# Patient Record
Sex: FEMALE | Race: OTHER | URBAN - METROPOLITAN AREA
[De-identification: names, ages, dates, MRNs, and addresses within clinical notes are randomized per-mention and may not be internally consistent; named-entity substitution may affect disease eponyms.]

---

## 2023-07-31 ENCOUNTER — OFFICE VISIT (OUTPATIENT)
Dept: FAMILY MEDICINE CLINIC | Facility: CLINIC | Age: 38
End: 2023-07-31
Payer: COMMERCIAL

## 2023-07-31 VITALS
HEIGHT: 57.01 IN | WEIGHT: 140.81 LBS | HEART RATE: 87 BPM | DIASTOLIC BLOOD PRESSURE: 80 MMHG | OXYGEN SATURATION: 99 % | RESPIRATION RATE: 18 BRPM | SYSTOLIC BLOOD PRESSURE: 122 MMHG | BODY MASS INDEX: 30.38 KG/M2 | TEMPERATURE: 98 F

## 2023-07-31 DIAGNOSIS — B07.0 PLANTAR WART, LEFT FOOT: ICD-10-CM

## 2023-07-31 DIAGNOSIS — Z13.21 SCREENING FOR ENDOCRINE, NUTRITIONAL, METABOLIC AND IMMUNITY DISORDER: ICD-10-CM

## 2023-07-31 DIAGNOSIS — E66.9 OBESITY (BMI 30-39.9): ICD-10-CM

## 2023-07-31 DIAGNOSIS — Z13.228 SCREENING FOR ENDOCRINE, NUTRITIONAL, METABOLIC AND IMMUNITY DISORDER: ICD-10-CM

## 2023-07-31 DIAGNOSIS — E55.9 VITAMIN D DEFICIENCY: ICD-10-CM

## 2023-07-31 DIAGNOSIS — Z00.00 ANNUAL PHYSICAL EXAM: Primary | ICD-10-CM

## 2023-07-31 DIAGNOSIS — Z12.4 SCREENING FOR CERVICAL CANCER: ICD-10-CM

## 2023-07-31 DIAGNOSIS — Z13.6 SCREENING FOR ISCHEMIC HEART DISEASE: ICD-10-CM

## 2023-07-31 DIAGNOSIS — Z13.29 SCREENING FOR ENDOCRINE, NUTRITIONAL, METABOLIC AND IMMUNITY DISORDER: ICD-10-CM

## 2023-07-31 DIAGNOSIS — Z12.31 ENCOUNTER FOR SCREENING MAMMOGRAM FOR MALIGNANT NEOPLASM OF BREAST: ICD-10-CM

## 2023-07-31 DIAGNOSIS — Z13.0 SCREENING FOR ENDOCRINE, NUTRITIONAL, METABOLIC AND IMMUNITY DISORDER: ICD-10-CM

## 2023-07-31 PROCEDURE — 3079F DIAST BP 80-89 MM HG: CPT | Performed by: FAMILY MEDICINE

## 2023-07-31 PROCEDURE — 99386 PREV VISIT NEW AGE 40-64: CPT | Performed by: FAMILY MEDICINE

## 2023-07-31 PROCEDURE — 3008F BODY MASS INDEX DOCD: CPT | Performed by: FAMILY MEDICINE

## 2023-07-31 PROCEDURE — 3074F SYST BP LT 130 MM HG: CPT | Performed by: FAMILY MEDICINE

## 2023-07-31 PROCEDURE — 87624 HPV HI-RISK TYP POOLED RSLT: CPT | Performed by: FAMILY MEDICINE

## 2023-08-01 LAB — HPV I/H RISK 1 DNA SPEC QL NAA+PROBE: NEGATIVE

## 2023-08-02 LAB
ABSOLUTE BASOPHILS: 38 CELLS/UL (ref 0–200)
ABSOLUTE EOSINOPHILS: 132 CELLS/UL (ref 15–500)
ABSOLUTE LYMPHOCYTES: 2041 CELLS/UL (ref 850–3900)
ABSOLUTE MONOCYTES: 309 CELLS/UL (ref 200–950)
ABSOLUTE NEUTROPHILS: 3780 CELLS/UL (ref 1500–7800)
ALBUMIN/GLOBULIN RATIO: 1.6 (CALC) (ref 1–2.5)
ALBUMIN: 4.3 G/DL (ref 3.6–5.1)
ALKALINE PHOSPHATASE: 71 U/L (ref 31–125)
ALT: 13 U/L (ref 6–29)
AST: 14 U/L (ref 10–30)
BASOPHILS: 0.6 %
BILIRUBIN, TOTAL: 0.3 MG/DL (ref 0.2–1.2)
BUN: 11 MG/DL (ref 7–25)
CALCIUM: 9.4 MG/DL (ref 8.6–10.2)
CARBON DIOXIDE: 26 MMOL/L (ref 20–32)
CHLORIDE: 105 MMOL/L (ref 98–110)
CHOL/HDLC RATIO: 4.5 (CALC)
CHOLESTEROL, TOTAL: 206 MG/DL
CREATININE: 0.63 MG/DL (ref 0.5–0.99)
EGFR: 115 ML/MIN/1.73M2
EOSINOPHILS: 2.1 %
GLOBULIN: 2.7 G/DL (CALC) (ref 1.9–3.7)
GLUCOSE: 91 MG/DL (ref 65–99)
HDL CHOLESTEROL: 46 MG/DL
HEMATOCRIT: 38.9 % (ref 35–45)
HEMOGLOBIN: 12.1 G/DL (ref 11.7–15.5)
LDL-CHOLESTEROL: 128 MG/DL (CALC)
LYMPHOCYTES: 32.4 %
MCH: 27.4 PG (ref 27–33)
MCHC: 31.1 G/DL (ref 32–36)
MCV: 88 FL (ref 80–100)
MONOCYTES: 4.9 %
MPV: 12 FL (ref 7.5–12.5)
NEUTROPHILS: 60 %
NON-HDL CHOLESTEROL: 160 MG/DL (CALC)
PLATELET COUNT: 223 THOUSAND/UL (ref 140–400)
POTASSIUM: 4.5 MMOL/L (ref 3.5–5.3)
PROTEIN, TOTAL: 7 G/DL (ref 6.1–8.1)
RDW: 13 % (ref 11–15)
RED BLOOD CELL COUNT: 4.42 MILLION/UL (ref 3.8–5.1)
SODIUM: 139 MMOL/L (ref 135–146)
T4, FREE: 1.3 NG/DL (ref 0.8–1.8)
TRIGLYCERIDES: 186 MG/DL
TSH W/REFLEX TO FT4: 5.84 MIU/L
VITAMIN D, 25-OH, TOTAL: 20 NG/ML (ref 30–100)
WHITE BLOOD CELL COUNT: 6.3 THOUSAND/UL (ref 3.8–10.8)

## 2023-08-28 ENCOUNTER — OFFICE VISIT (OUTPATIENT)
Dept: FAMILY MEDICINE CLINIC | Facility: CLINIC | Age: 38
End: 2023-08-28
Payer: COMMERCIAL

## 2023-08-28 VITALS
OXYGEN SATURATION: 99 % | WEIGHT: 139.81 LBS | BODY MASS INDEX: 30 KG/M2 | TEMPERATURE: 98 F | SYSTOLIC BLOOD PRESSURE: 126 MMHG | RESPIRATION RATE: 18 BRPM | HEART RATE: 65 BPM | DIASTOLIC BLOOD PRESSURE: 84 MMHG

## 2023-08-28 DIAGNOSIS — E55.9 VITAMIN D DEFICIENCY: ICD-10-CM

## 2023-08-28 DIAGNOSIS — E78.2 MIXED HYPERLIPIDEMIA: ICD-10-CM

## 2023-08-28 DIAGNOSIS — E03.9 HYPOTHYROIDISM, UNSPECIFIED TYPE: Primary | ICD-10-CM

## 2023-08-28 PROCEDURE — 99214 OFFICE O/P EST MOD 30 MIN: CPT | Performed by: FAMILY MEDICINE

## 2023-08-28 PROCEDURE — 3074F SYST BP LT 130 MM HG: CPT | Performed by: FAMILY MEDICINE

## 2023-08-28 PROCEDURE — 3079F DIAST BP 80-89 MM HG: CPT | Performed by: FAMILY MEDICINE

## 2023-08-28 RX ORDER — CHOLECALCIFEROL (VITAMIN D3) 1250 MCG
1 CAPSULE ORAL WEEKLY
Qty: 12 CAPSULE | Refills: 0 | Status: SHIPPED | OUTPATIENT
Start: 2023-08-28

## 2023-08-28 RX ORDER — LEVOTHYROXINE SODIUM 0.03 MG/1
25 TABLET ORAL
Qty: 90 TABLET | Refills: 0 | Status: SHIPPED | OUTPATIENT
Start: 2023-08-28

## 2023-10-04 ENCOUNTER — HOSPITAL ENCOUNTER (OUTPATIENT)
Dept: MAMMOGRAPHY | Facility: HOSPITAL | Age: 38
Discharge: HOME | End: 2023-10-04
Attending: FAMILY MEDICINE
Payer: COMMERCIAL

## 2023-10-04 DIAGNOSIS — Z12.31 ENCOUNTER FOR SCREENING MAMMOGRAM FOR MALIGNANT NEOPLASM OF BREAST: ICD-10-CM

## 2023-10-04 PROCEDURE — 77067 SCR MAMMO BI INCL CAD: CPT | Performed by: FAMILY MEDICINE

## 2023-10-04 PROCEDURE — 77063 BREAST TOMOSYNTHESIS BI: CPT | Performed by: FAMILY MEDICINE

## 2023-10-26 ENCOUNTER — HOSPITAL ENCOUNTER (OUTPATIENT)
Dept: MAMMOGRAPHY | Facility: HOSPITAL | Age: 38
Discharge: HOME | End: 2023-10-26
Attending: FAMILY MEDICINE

## 2023-10-26 DIAGNOSIS — R92.2 INCONCLUSIVE MAMMOGRAM: ICD-10-CM

## 2023-10-26 DIAGNOSIS — R92.8 ABNORMAL MAMMOGRAM: Primary | ICD-10-CM

## 2023-10-26 PROCEDURE — 76642 ULTRASOUND BREAST LIMITED: CPT | Performed by: FAMILY MEDICINE

## 2023-10-26 PROCEDURE — 77066 DX MAMMO INCL CAD BI: CPT | Performed by: FAMILY MEDICINE

## 2023-10-26 PROCEDURE — 77062 BREAST TOMOSYNTHESIS BI: CPT | Performed by: FAMILY MEDICINE

## 2023-10-26 NOTE — IMAGING NOTE
Madysonluba Shannon is recommended for an ultrasound guided biopsy of the right breast x 2 sites by . History Inconclusive mammogram   Findings-There are 2 masses in the right breast at the 10 o'clock position 13 cm from nipple and the 9:30 position 10 cm from nipple which may represent fibroadenomas, but for which tissue sampling is recommended for definitive histopathologic diagnosis. Recommendation-ULTRASOUND-GUIDED BIOPSY: RIGHT BREASTx 2     See EMR for complete imaging report    Medications and allergies reviewed: NKDA reported  Current Outpatient Medications   Medication Sig Dispense Refill    levothyroxine 25 MCG Oral Tab Take 1 tablet (25 mcg total) by mouth before breakfast. 90 tablet 0    Cholecalciferol (VITAMIN D3) 1.25 MG (92272 UT) Oral Cap Take 1 capsule by mouth once a week. 12 capsule 0       Madyson Faithnereida denies the use of prescribed anticoagulants, denies known bleeding disorders and/or liver disease, denies chemotherapy    Procedure explained to [facility] and her  Marcella Polk. All questions answered to [employer] satisfaction. [facility] provided with written educational material.     Ms. Krystin Shannon instructed to take 1000 mg of acetaminophen on the day of the biopsy, eat a light meal, and bring or wear a sport bra. Post biopsy care and instruction reviewed: including no lifting more than five pounds, no upper body exercise, icing of biopsy site, no submerging in water. Madyson Shannon verbalized understanding. Ms. Krystin Shannon provided with an appointment at [facility] women's imaging center- Friday, November 24 at 1300. Appointment confirmed with breast center .

## 2023-11-17 DIAGNOSIS — E55.9 VITAMIN D DEFICIENCY: ICD-10-CM

## 2023-11-17 RX ORDER — CHOLECALCIFEROL (VITAMIN D3) 1250 MCG
1 CAPSULE ORAL WEEKLY
Qty: 12 CAPSULE | Refills: 0 | OUTPATIENT
Start: 2023-11-17

## 2023-11-17 NOTE — TELEPHONE ENCOUNTER
Refill inappropriate. Pt needs f/u appt. Per LOV 8/28/2023    Return in about 3 months (around 11/28/2023) for hypothyroidism, medication follow-up, Cholesterol.

## 2023-11-21 ENCOUNTER — TELEPHONE (OUTPATIENT)
Dept: FAMILY MEDICINE CLINIC | Facility: CLINIC | Age: 38
End: 2023-11-21

## 2023-11-21 NOTE — TELEPHONE ENCOUNTER
Heather from mammography called and wants to inform provider that pt's  called and cancelled the scheduled breast biopsy.    Routed to provider for review of above.

## 2023-11-22 NOTE — TELEPHONE ENCOUNTER
Noted.    Please call and speak with pt to see about if they will reschedule the breast biopsy. (On mammogram she was noted to have 2 masses in the right breast, radiologist reccomends biopsy of both sites).  Thye need to call mammogram department again to schedule this.     Thanks.

## 2023-11-22 NOTE — TELEPHONE ENCOUNTER
LMOM to return call to the office. Provided pt office phone [phone] along with office hours.

## 2023-11-23 DIAGNOSIS — E03.9 HYPOTHYROIDISM, UNSPECIFIED TYPE: ICD-10-CM

## 2023-11-24 RX ORDER — LEVOTHYROXINE SODIUM 0.03 MG/1
25 TABLET ORAL
Qty: 30 TABLET | Refills: 0 | Status: SHIPPED | OUTPATIENT
Start: 2023-11-24

## 2023-11-24 NOTE — TELEPHONE ENCOUNTER
Pt said she needs to talk to her insurance regarding levothyroxine since it was not covered. Pt said she will call back to our office to schedule an appointment. Pt does not want our office to call her about this issue.

## 2023-11-24 NOTE — TELEPHONE ENCOUNTER
30-day supply given for now. Please inform pt she is due to recheck her TSH level, to make sure she is at a therapeutic level. Lab order is in chart for [facility].     Thanks.

## 2023-11-24 NOTE — TELEPHONE ENCOUNTER
Refill Failed Protocol:     Pt requesting refill of   Requested Prescriptions     Pending Prescriptions Disp Refills    LEVOTHYROXINE 25 MCG Oral Tab [Pharmacy Med Name: LEVOTHYROXINE 25 MCG TABLET] 90 tablet 0     Sig: TAKE 1 TABLET BY MOUTH BEFORE BREAKFAST. Last Time Medication was Filled:  8/28/2023    Last Office Visit with Provider: 8/8/2023    Unable to approve refill,   Thyroid Supplements Protocol [id number]/23/2023 12:16 AM    TSH value between 0.350 and 5.500 IU/ml    TSH test in past 12 months    Appointment in past 12 or next 3 months     No future appointments. Per LOV    Return in about 3 months (around 11/28/2023) for hypothyroidism, medication follow-up, Cholesterol. LMOM to return call to the office. Provided pt office phone [phone] along with office hours.

## 2023-12-11 DIAGNOSIS — E03.9 HYPOTHYROIDISM, UNSPECIFIED TYPE: ICD-10-CM

## 2023-12-12 RX ORDER — LEVOTHYROXINE SODIUM 0.03 MG/1
25 TABLET ORAL
Qty: 7 TABLET | Refills: 0 | Status: SHIPPED | OUTPATIENT
Start: 2023-12-12

## 2023-12-12 NOTE — TELEPHONE ENCOUNTER
Pt still has not completed repeat fasting labs and TSH in her chart. Need to make sure she is at therapeutic level. Her previous TSH was abnormal.  Will only give 1 week supply. Please call her to tell her to go to [facility], lab orders in chart. See previous refill request from 11/23/23:      11/24/23  9:18 AM  Note  30-day supply given for now. Please inform pt she is due to recheck her TSH level, to make sure she is at a therapeutic level. Lab order is in chart for [facility].      Thanks. Thanks.

## 2023-12-12 NOTE — TELEPHONE ENCOUNTER
Refill Failed Protocol:     Pt requesting refill of   Requested Prescriptions     Pending Prescriptions Disp Refills    LEVOTHYROXINE 25 MCG Oral Tab [Pharmacy Med Name: LEVOTHYROXINE 25 MCG TABLET] 30 tablet 0     Sig: TAKE 1 TABLET BY MOUTH EVERY DAY BEFORE BREAKFAST      Last Time Medication was Filled:  11/24/2023    Last Office Visit with Provider: 8/28/2023    Unable to approve refill,   Thyroid Supplements Protocol [id number]/11/2023 09:45 PM   Protocol Details TSH value between 0.350 and 5.500 IU/ml    TSH test in past 12 months    Appointment in past 12 or next 3 months          No future appointments.

## 2023-12-15 ENCOUNTER — TELEPHONE (OUTPATIENT)
Dept: FAMILY MEDICINE CLINIC | Facility: CLINIC | Age: 38
End: 2023-12-15

## 2023-12-15 ENCOUNTER — TELEPHONE (OUTPATIENT)
Dept: MAMMOGRAPHY | Facility: HOSPITAL | Age: 38
End: 2023-12-15

## 2023-12-15 NOTE — TELEPHONE ENCOUNTER
Alisha with [facility] called office to follow up on Biopsy recommendation she wants to know if the nurse got a hold of the patient in regards to this matter.

## 2023-12-15 NOTE — TELEPHONE ENCOUNTER
Left message with office of Dr Shannan Sullivan regarding ability to contact patient for follow up of biopsy recommendation from 10-26-23. Nurse was given message to return our call.

## 2023-12-15 NOTE — TELEPHONE ENCOUNTER
Called pt, spouse  the phone. He states they have not schedule biopsy yet. They will call us when they do.     Called Alisha, unable to reach, message left,

## 2024-01-24 ENCOUNTER — TELEPHONE (OUTPATIENT)
Dept: FAMILY MEDICINE CLINIC | Facility: CLINIC | Age: 39
End: 2024-01-24

## 2024-01-24 NOTE — TELEPHONE ENCOUNTER
RN with Mammography called, states PCP ordered breast biopsy but pt has not gotten procedure done. RN thinks spouse is the reason pt has not gotten procedure.    RN would like PCP to discus this with pt during 1/25/2024 appt and determine if  pt will not be getting biopsy so Mammo department can stop calling her.    Routed to provider for review.

## 2024-01-25 ENCOUNTER — OFFICE VISIT (OUTPATIENT)
Dept: FAMILY MEDICINE CLINIC | Facility: CLINIC | Age: 39
End: 2024-01-25
Payer: COMMERCIAL

## 2024-01-25 VITALS
RESPIRATION RATE: 18 BRPM | DIASTOLIC BLOOD PRESSURE: 90 MMHG | WEIGHT: 145.63 LBS | BODY MASS INDEX: 32 KG/M2 | TEMPERATURE: 98 F | SYSTOLIC BLOOD PRESSURE: 122 MMHG | HEART RATE: 81 BPM | OXYGEN SATURATION: 100 %

## 2024-01-25 DIAGNOSIS — Z23 NEED FOR VACCINATION: ICD-10-CM

## 2024-01-25 DIAGNOSIS — E78.2 MIXED HYPERLIPIDEMIA: ICD-10-CM

## 2024-01-25 DIAGNOSIS — E03.9 HYPOTHYROIDISM, UNSPECIFIED TYPE: Primary | ICD-10-CM

## 2024-01-25 DIAGNOSIS — R92.8 ABNORMAL MAMMOGRAM: ICD-10-CM

## 2024-01-25 DIAGNOSIS — E55.9 VITAMIN D DEFICIENCY: ICD-10-CM

## 2024-01-25 PROBLEM — R79.89 ELEVATED TSH: Status: ACTIVE | Noted: 2017-09-14

## 2024-01-25 PROCEDURE — 3074F SYST BP LT 130 MM HG: CPT | Performed by: FAMILY MEDICINE

## 2024-01-25 PROCEDURE — 99214 OFFICE O/P EST MOD 30 MIN: CPT | Performed by: FAMILY MEDICINE

## 2024-01-25 PROCEDURE — 3080F DIAST BP >= 90 MM HG: CPT | Performed by: FAMILY MEDICINE

## 2024-01-25 RX ORDER — LEVOTHYROXINE SODIUM 0.03 MG/1
25 TABLET ORAL
Qty: 90 TABLET | Refills: 1 | Status: SHIPPED | OUTPATIENT
Start: 2024-01-25

## 2024-01-28 NOTE — PROGRESS NOTES
CHIEF COMPLAINT:   Chief Complaint   Patient presents with    Follow - Up         HPI:     Madyson Flynn is a 38 year old female presents for HL, thyroid, and abnormal mammo.      HL:  note don 2023 labs to have elevate lipids. Has been walking at home, but no dedicated exercise.    Previous HPI from 2023: pt was noted on routine physical labs in 2023 to have elevated lipids. She does not eat much meat in her diet. She also tries to limit her sugars and sweets.      Vit D deficiency: Was noted on recent labs to have low Vit D in 2023. Completed high dose Vit D replacement, but did not recheck labs yet.     Hypothyroidism:  was advised to restart L-thyroxine 25 mcg  at previous visit. Is due for recheck. She has fatigue, otherwise feels well.    Previous HPI from 2023: had hypothyroidism while pregnant with her first son 13 yrs ago, then problem persisted during the 2nd pregnancy.  She stopped taking the medication 6 yrs ago, since she felt well and labs were normal while off of medication.  This was while she was still in [place].  Lately, she has been experiencing weight gain, dry skin, fatigue.      Abnormal mammo: 2 masses seen in right breast on mammo.  Recommended for breast bx, but pt has not scheduled yet, she and  feel it is not necessary since \"I feel fine.\"                HISTORY:  No past medical history on file.   Past Surgical History:   Procedure Laterality Date     DELIVERY ONLY      ,     TUBAL LIGATION      2016      Family History   Problem Relation Age of Onset    No Known Problems Mother     No Known Problems Father     No Known Problems Maternal Grandmother     No Known Problems Maternal Grandfather     No Known Problems Paternal Grandmother     No Known Problems Paternal Grandfather     Breast Cancer Neg     Colon Cancer Neg       Social History:   Social History     Socioeconomic History    Marital status:    Tobacco Use    Smoking status: Never    Smokeless  tobacco: Never   Substance and Sexual Activity    Alcohol use: Never    Drug use: Never        Medications (Active prior to today's visit):  Current Outpatient Medications   Medication Sig Dispense Refill    levothyroxine 25 MCG Oral Tab Take 1 tablet (25 mcg total) by mouth before breakfast. 90 tablet 1    Cholecalciferol (VITAMIN D3) 1.25 MG (62688 UT) Oral Cap Take 1 capsule by mouth once a week. 12 capsule 0       Allergies:  Not on File    PSFH elements reviewed from today and agreed except as otherwise stated in HPI.  ROS:     Review of Systems   Constitutional:  Positive for fatigue. Negative for appetite change and unexpected weight change.   Gastrointestinal:  Negative for abdominal pain, constipation, nausea and vomiting.   Endocrine: Negative for cold intolerance.         Pertinent positives and negatives noted in the the HPI.    PHYSICAL EXAM:   /90 (BP Location: Left arm, Patient Position: Sitting, Cuff Size: adult)   Pulse 81   Temp 98.1 °F (36.7 °C) (Temporal)   Resp 18   Wt 145 lb 9.6 oz (66 kg)   LMP 01/12/2024 (Exact Date)   SpO2 100%   BMI 31.50 kg/m²   Vital signs reviewed.Appears stated age, well groomed.  Physical Exam  Vitals reviewed.   Constitutional:       Appearance: Normal appearance.   HENT:      Head: Normocephalic.   Neck:      Thyroid: No thyroid mass, thyromegaly or thyroid tenderness.   Cardiovascular:      Rate and Rhythm: Normal rate and regular rhythm.      Pulses: Normal pulses.      Heart sounds: Normal heart sounds.   Pulmonary:      Effort: Pulmonary effort is normal. No respiratory distress.      Breath sounds: Normal breath sounds.   Skin:     General: Skin is warm and dry.   Neurological:      General: No focal deficit present.      Mental Status: She is oriented to person, place, and time.   Psychiatric:         Mood and Affect: Mood normal.         Behavior: Behavior normal.          LABS     No visits with results within 2 Month(s) from this visit.   Latest  known visit with results is:   Orders Only on 08/01/2023   Component Date Value    CHOLESTEROL, TOTAL 08/01/2023 206 (H)     HDL CHOLESTEROL 08/01/2023 46 (L)     TRIGLYCERIDES 08/01/2023 186 (H)     LDL-CHOLESTEROL 08/01/2023 128 (H)     CHOL/HDLC RATIO 08/01/2023 4.5     NON-HDL CHOLESTEROL 08/01/2023 160 (H)     GLUCOSE 08/01/2023 91     UREA NITROGEN (BUN) 08/01/2023 11     CREATININE 08/01/2023 0.63     EGFR 08/01/2023 115     BUN/CREATININE RATIO 08/01/2023 SEE NOTE:     SODIUM 08/01/2023 139     POTASSIUM 08/01/2023 4.5     CHLORIDE 08/01/2023 105     CARBON DIOXIDE 08/01/2023 26     CALCIUM 08/01/2023 9.4     PROTEIN, TOTAL 08/01/2023 7.0     ALBUMIN 08/01/2023 4.3     GLOBULIN 08/01/2023 2.7     ALBUMIN/GLOBULIN RATIO 08/01/2023 1.6     BILIRUBIN, TOTAL 08/01/2023 0.3     ALKALINE PHOSPHATASE 08/01/2023 71     AST 08/01/2023 14     ALT 08/01/2023 13     WHITE BLOOD CELL COUNT 08/01/2023 6.3     RED BLOOD CELL COUNT 08/01/2023 4.42     HEMOGLOBIN 08/01/2023 12.1     HEMATOCRIT 08/01/2023 38.9     MCV 08/01/2023 88.0     MCH 08/01/2023 27.4     MCHC 08/01/2023 31.1 (L)     RDW 08/01/2023 13.0     PLATELET COUNT 08/01/2023 223     MPV 08/01/2023 12.0     ABSOLUTE NEUTROPHILS 08/01/2023 3,780     ABSOLUTE LYMPHOCYTES 08/01/2023 2,041     ABSOLUTE MONOCYTES 08/01/2023 309     ABSOLUTE EOSINOPHILS 08/01/2023 132     ABSOLUTE BASOPHILS 08/01/2023 38     NEUTROPHILS 08/01/2023 60     LYMPHOCYTES 08/01/2023 32.4     MONOCYTES 08/01/2023 4.9     EOSINOPHILS 08/01/2023 2.1     BASOPHILS 08/01/2023 0.6     TSH W/REFLEX TO FT4 08/01/2023 5.84 (H)     T4, FREE 08/01/2023 1.3     VITAMIN D, 25-OH, TOTAL 08/01/2023 20 (L)       REVIEWED THIS VISIT  ASSESSMENT/PLAN:   38 year old female with    1. Hypothyroidism, unspecified type  - h/o taking Levothyroxine during her pregnancies x 2  - restarted the Levothyroxine at 25 mcg daily at 8/2023 visit, due to recheck TSH     - levothyroxine 25 MCG Oral Tab; Take 1 tablet (25 mcg  total) by mouth before breakfast.  Dispense: 90 tablet; Refill: 1    2. Vitamin D deficiency  - completed high dose Vit D weekly, due to rechec    3. Mixed hyperlipidemia  - noted to have elevated lipids  - diet and lifetsyle recs   - recheck lipids to monitor progress  - 10-yr ASCVD risk score is 0.8%    4. Abnormal mammogram  - had abrnomal mammo with 2 masses seen in right breast and recommended to perform breast bx  - mammo dept reached out to me to d/w pt  - strongly advised for pt to complete breast bx    5. Need for vaccination    - [facility] REFUSED [facility]      Meds This Visit:  Requested Prescriptions     Signed Prescriptions Disp Refills    levothyroxine 25 MCG Oral Tab 90 tablet 1     Sig: Take 1 tablet (25 mcg total) by mouth before breakfast.       Health Maintenance:  Health Maintenance   Topic Date Due    COVID-19 Vaccine (1) Never done    HTN: BP Follow-Up  02/25/2024    Influenza Vaccine (1) 06/30/2024 (Originally 10/1/2023)    Annual Physical  07/31/2024    Mammogram  10/26/2024    DTaP,Tdap,and Td Vaccines (2 - Td or Tdap) 02/16/2025    Pap Smear  07/31/2028    Annual Depression Screening  Completed    Pneumococcal Vaccine: Birth to 64yrs  Aged Out         Patient/Caregiver Education: There are no barriers to learning. Medical education done.   Outcome: Patient verbalizes understanding and agrees with plan. Advised to call or RTC if symptoms persist or worsen.    Problem List:     Patient Active Problem List   Diagnosis    Elevated TSH       Imaging & Referrals:  [facility]FL[facility] REFUSED [facility]     1/28/2024  Sandra Rodriguez MD      Patient understands plan and follow-up.  Return in about 6 months (around 7/25/2024) for annual physical, hypothyroidism, Cholesterol; advised pt to call to schedule breast biopsy.

## 2024-02-03 ENCOUNTER — PATIENT MESSAGE (OUTPATIENT)
Dept: FAMILY MEDICINE CLINIC | Facility: CLINIC | Age: 39
End: 2024-02-03

## 2024-02-05 NOTE — TELEPHONE ENCOUNTER
From: Madyson Flynn  To: Sandra Rodriguez  Sent: 2/3/2024 10:16 AM CST  Subject: Regarding Billing    Hi, I have received a payment due for which I have an inquiry. Per insurance I have a copay of $20 but I got payment due for $30. Can you please check if that is accurate.

## 2024-02-23 ENCOUNTER — HOSPITAL ENCOUNTER (OUTPATIENT)
Dept: MAMMOGRAPHY | Facility: HOSPITAL | Age: 39
Discharge: HOME | End: 2024-02-23
Attending: FAMILY MEDICINE
Payer: COMMERCIAL

## 2024-02-23 DIAGNOSIS — R92.8 ABNORMAL MAMMOGRAM: ICD-10-CM

## 2024-02-23 PROCEDURE — 19083 BX BREAST 1ST LESION US IMAG: CPT | Performed by: FAMILY MEDICINE

## 2024-02-23 PROCEDURE — 88305 TISSUE EXAM BY PATHOLOGIST: CPT | Performed by: FAMILY MEDICINE

## 2024-02-23 PROCEDURE — 19084 BX BREAST ADD LESION US IMAG: CPT | Performed by: FAMILY MEDICINE

## 2024-02-23 PROCEDURE — 88344 IMHCHEM/IMCYTCHM EA MLT ANTB: CPT | Performed by: FAMILY MEDICINE

## 2024-02-23 PROCEDURE — 77065 DX MAMMO INCL CAD UNI: CPT | Performed by: FAMILY MEDICINE

## 2024-02-27 ENCOUNTER — TELEPHONE (OUTPATIENT)
Dept: MAMMOGRAPHY | Facility: HOSPITAL | Age: 39
End: 2024-02-27

## 2024-02-27 NOTE — TELEPHONE ENCOUNTER
Telephoned Madyson Flynn and name,  verified with patient. Notified Madyson Flynn of right breast 2 site negative for malignancy biopsy result. Concordance pending. Madyson Flynn reports biopsy site is healing well.  Radiologist recommends 6 month follow up breast imaging.  Pt verbalized understanding and had no further questions at this time.

## 2024-02-28 ENCOUNTER — TELEPHONE (OUTPATIENT)
Dept: FAMILY MEDICINE CLINIC | Facility: CLINIC | Age: 39
End: 2024-02-28

## 2024-02-28 DIAGNOSIS — D24.1 FIBROADENOMA OF BREAST, RIGHT: Primary | ICD-10-CM

## 2024-02-28 DIAGNOSIS — N64.89 PSEUDOANGIOMATOUS STROMAL HYPERPLASIA OF BREAST: ICD-10-CM

## 2024-02-28 DIAGNOSIS — D24.9 FIBROADENOMA OF BREAST, UNSPECIFIED LATERALITY: Primary | ICD-10-CM

## 2024-02-28 NOTE — TELEPHONE ENCOUNTER
Sandra Rodriguez MD  2/28/2024 10:42 AM CST       I offered referral to breast surgeon in case she is having pain and wants evaluation for possible removal of fibroadenoma. If she is feeling fine, then she can cont with observationsa nd repeat mammogram in 6 months as told by mammo dept. Thanks.     Krystin Sepulveda MA  2/28/2024 12:57 PM CST       Pt informed of and reviewed test results and indications per  regarding test results       Called spouse, asked if they still had questions or concerns regarding biopsy results.     Spouse states they spoke with someone earlier and everything was cleared up.    Closing encounter.

## 2024-02-28 NOTE — TELEPHONE ENCOUNTER
Spouse called, states pt had breast biopsy done and they received the results via My Chart. They also received a call with the results of the biopsy as benign.     However, they received another call today that confused them. They want to know if PCP can review biopsy results and reassure them that there is nothing to worry about.    Routed to provider for review.

## 2024-02-28 NOTE — TELEPHONE ENCOUNTER
Reviewed. See result notes of biopsy report.    Also, I believe ADAM Ramos was working on this and had called the pt spouse. Please clarify is she already called pt spouse back on this afer I replied to her message. Thanks.

## 2024-05-13 ENCOUNTER — TELEPHONE (OUTPATIENT)
Dept: FAMILY MEDICINE CLINIC | Facility: CLINIC | Age: 39
End: 2024-05-13

## 2024-05-13 NOTE — TELEPHONE ENCOUNTER
Outreach call to pt, spoke to pt  on Verbal release, informed pt has TSH, lipid, and Vitamin D ordered [facility] and pt should complete prior to upcoming apt in July. Pt  voiced understanding and will notify patient.

## 2024-05-15 DIAGNOSIS — Z13.0 SCREENING FOR ENDOCRINE, METABOLIC, AND IMMUNITY DISORDER: Primary | ICD-10-CM

## 2024-05-15 DIAGNOSIS — Z13.228 SCREENING FOR ENDOCRINE, METABOLIC, AND IMMUNITY DISORDER: Primary | ICD-10-CM

## 2024-05-15 DIAGNOSIS — E78.2 MIXED HYPERLIPIDEMIA: ICD-10-CM

## 2024-05-15 DIAGNOSIS — E55.9 VITAMIN D DEFICIENCY: ICD-10-CM

## 2024-05-15 DIAGNOSIS — Z13.29 SCREENING FOR ENDOCRINE, METABOLIC, AND IMMUNITY DISORDER: Primary | ICD-10-CM

## 2024-05-15 DIAGNOSIS — E03.9 HYPOTHYROIDISM, UNSPECIFIED TYPE: ICD-10-CM

## 2024-05-15 LAB
CHOL/HDLC RATIO: 3.7 (CALC)
CHOLESTEROL, TOTAL: 203 MG/DL
HDL CHOLESTEROL: 55 MG/DL
LDL-CHOLESTEROL: 128 MG/DL (CALC)
NON-HDL CHOLESTEROL: 148 MG/DL (CALC)
TRIGLYCERIDES: 97 MG/DL
TSH W/REFLEX TO FT4: 3.57 MIU/L
VITAMIN D, 25-OH, TOTAL: 33 NG/ML (ref 30–100)

## 2024-08-16 DIAGNOSIS — E03.9 HYPOTHYROIDISM, UNSPECIFIED TYPE: ICD-10-CM

## 2024-08-16 RX ORDER — LEVOTHYROXINE SODIUM 0.03 MG/1
25 TABLET ORAL
Qty: 90 TABLET | Refills: 0 | Status: SHIPPED | OUTPATIENT
Start: 2024-08-16

## 2024-08-16 NOTE — TELEPHONE ENCOUNTER
Refill Passed Protocol:     Pt requesting refill of   Requested Prescriptions     Pending Prescriptions Disp Refills    levothyroxine 25 MCG Oral Tab [Pharmacy Med Name: LEVOTHYROXINE 25 MCG TABLET] 90 tablet 0     Sig: TAKE 1 TABLET BY MOUTH BEFORE BREAKFAST.      Refill was approved and sent to pharmacy:     Thyroid Medication Protocol [id number]/16/2024 12:06 AM   Protocol Details TSH in past 12 months    Last TSH value is normal    In person appointment or virtual visit in the past 12 mos or appointment in next 3 mos        Last Time Medication was Filled:  1/25/2024 90 days 1 refill    Last Office Visit with Provider: 1/25/2024  Hypothyroidism, unspecified type  - h/o taking Levothyroxine during her pregnancies x 2  - restarted the Levothyroxine at 25 mcg daily at 8/2023 visit, due to recheck TSH    No future appointments.   Return in about 6 months (around 7/25/2024) for annual physical, hypothyroidism, Cholesterol; advised pt to call to schedule breast biopsy.

## 2024-11-23 DIAGNOSIS — E03.9 HYPOTHYROIDISM, UNSPECIFIED TYPE: ICD-10-CM

## 2024-11-25 RX ORDER — LEVOTHYROXINE SODIUM 25 UG/1
25 TABLET ORAL
Qty: 90 TABLET | Refills: 0 | OUTPATIENT
Start: 2024-11-25

## 2024-11-25 NOTE — TELEPHONE ENCOUNTER
Pt requesting refill of   Requested Prescriptions     Pending Prescriptions Disp Refills    LEVOTHYROXINE 25 MCG Oral Tab [Pharmacy Med Name: LEVOTHYROXINE 25 MCG TABLET] 90 tablet 0     Sig: TAKE 1 TABLET BY MOUTH BEFORE BREAKFAST.      Last Time Medication was Filled:  8/16/2024 90 days 0 refills    Last Office Visit with Provider: 1/25/2024  Hypothyroidism, unspecified type  - h/o taking Levothyroxine during her pregnancies x 2  - restarted the Levothyroxine at 25 mcg daily at 8/2023 visit, due to recheck TSH      Return in about 6 months (around 7/25/2024) for annual physical, hypothyroidism, Cholesterol; advised pt to call to schedule breast biopsy.   No future appointments.